# Patient Record
Sex: FEMALE | Race: WHITE | NOT HISPANIC OR LATINO | Employment: OTHER | URBAN - METROPOLITAN AREA
[De-identification: names, ages, dates, MRNs, and addresses within clinical notes are randomized per-mention and may not be internally consistent; named-entity substitution may affect disease eponyms.]

---

## 2018-11-16 ENCOUNTER — OFFICE VISIT CONVERTED (OUTPATIENT)
Dept: ONCOLOGY | Facility: HOSPITAL | Age: 73
End: 2018-11-16
Attending: INTERNAL MEDICINE

## 2019-07-01 ENCOUNTER — HOSPITAL ENCOUNTER (OUTPATIENT)
Dept: ONCOLOGY | Facility: HOSPITAL | Age: 74
Discharge: HOME | End: 2019-07-01
Attending: INTERNAL MEDICINE

## 2019-07-01 ENCOUNTER — OFFICE VISIT CONVERTED (OUTPATIENT)
Dept: ONCOLOGY | Facility: HOSPITAL | Age: 74
End: 2019-07-01
Attending: INTERNAL MEDICINE

## 2019-07-01 LAB
ALBUMIN SERPL-MCNC: 3.7 G/DL (ref 3.5–5)
ALBUMIN/GLOB SERPL: 1.2 {RATIO} (ref 1.4–2.6)
ALP SERPL-CCNC: 57 U/L (ref 43–160)
ALT SERPL-CCNC: 11 U/L (ref 10–40)
ANION GAP SERPL CALC-SCNC: 16 MMOL/L (ref 8–19)
AST SERPL-CCNC: 17 U/L (ref 15–50)
BASOPHILS # BLD AUTO: 0.02 10*3/UL (ref 0–0.2)
BASOPHILS NFR BLD AUTO: 0.4 % (ref 0–3)
BILIRUB SERPL-MCNC: 0.4 MG/DL (ref 0.2–1.3)
BUN SERPL-MCNC: 9 MG/DL (ref 5–25)
BUN/CREAT SERPL: 12 {RATIO} (ref 6–20)
CALCIUM SERPL-MCNC: 9.8 MG/DL (ref 8.7–10.4)
CEA SERPL-MCNC: 1.7 NG/ML (ref 0–5)
CHLORIDE SERPL-SCNC: 104 MMOL/L (ref 99–111)
CONV ABS IMM GRAN: 0.01 10*3/UL (ref 0–0.2)
CONV CO2: 23 MMOL/L (ref 22–32)
CONV IMMATURE GRAN: 0.2 % (ref 0–1.8)
CONV TOTAL PROTEIN: 6.7 G/DL (ref 6.3–8.2)
CREAT UR-MCNC: 0.73 MG/DL (ref 0.5–0.9)
DEPRECATED RDW RBC AUTO: 47.6 FL (ref 36.4–46.3)
EOSINOPHIL # BLD AUTO: 0.14 10*3/UL (ref 0–0.7)
EOSINOPHIL # BLD AUTO: 2.9 % (ref 0–7)
ERYTHROCYTE [DISTWIDTH] IN BLOOD BY AUTOMATED COUNT: 14 % (ref 11.7–14.4)
GFR SERPLBLD BASED ON 1.73 SQ M-ARVRAT: >60 ML/MIN/{1.73_M2}
GLOBULIN UR ELPH-MCNC: 3 G/DL (ref 2–3.5)
GLUCOSE SERPL-MCNC: 86 MG/DL (ref 65–99)
HBA1C MFR BLD: 13.1 G/DL (ref 12–16)
HCT VFR BLD AUTO: 42.2 % (ref 37–47)
LDH SERPL-CCNC: 173 U/L (ref 120–240)
LYMPHOCYTES # BLD AUTO: 1.01 10*3/UL (ref 1–5)
MCH RBC QN AUTO: 28.4 PG (ref 27–31)
MCHC RBC AUTO-ENTMCNC: 31 G/DL (ref 33–37)
MCV RBC AUTO: 91.5 FL (ref 81–99)
MONOCYTES # BLD AUTO: 0.54 10*3/UL (ref 0.2–1.2)
MONOCYTES NFR BLD AUTO: 11.2 % (ref 3–10)
NEUTROPHILS # BLD AUTO: 3.09 10*3/UL (ref 2–8)
NEUTROPHILS NFR BLD AUTO: 64.3 % (ref 30–85)
NRBC CBCN: 0 % (ref 0–0.7)
OSMOLALITY SERPL CALC.SUM OF ELEC: 286 MOSM/KG (ref 273–304)
PLATELET # BLD AUTO: 123 10*3/UL (ref 130–400)
PMV BLD AUTO: 11.5 FL (ref 9.4–12.3)
POTASSIUM SERPL-SCNC: 4 MMOL/L (ref 3.5–5.3)
RBC # BLD AUTO: 4.61 10*6/UL (ref 4.2–5.4)
SODIUM SERPL-SCNC: 139 MMOL/L (ref 135–147)
VARIANT LYMPHS NFR BLD MANUAL: 21 % (ref 20–45)
WBC # BLD AUTO: 4.81 10*3/UL (ref 4.8–10.8)

## 2020-01-10 ENCOUNTER — OFFICE VISIT CONVERTED (OUTPATIENT)
Dept: ONCOLOGY | Facility: HOSPITAL | Age: 75
End: 2020-01-10
Attending: INTERNAL MEDICINE

## 2020-01-10 ENCOUNTER — HOSPITAL ENCOUNTER (OUTPATIENT)
Dept: ONCOLOGY | Facility: HOSPITAL | Age: 75
Discharge: HOME | End: 2020-01-10
Attending: INTERNAL MEDICINE

## 2021-01-11 ENCOUNTER — OFFICE VISIT CONVERTED (OUTPATIENT)
Dept: ONCOLOGY | Facility: HOSPITAL | Age: 76
End: 2021-01-11
Attending: INTERNAL MEDICINE

## 2021-01-11 ENCOUNTER — HOSPITAL ENCOUNTER (OUTPATIENT)
Dept: ONCOLOGY | Facility: HOSPITAL | Age: 76
Discharge: HOME | End: 2021-01-11
Attending: INTERNAL MEDICINE

## 2021-04-09 ENCOUNTER — OFFICE VISIT CONVERTED (OUTPATIENT)
Dept: UROLOGY | Facility: CLINIC | Age: 76
End: 2021-04-09
Attending: NURSE PRACTITIONER

## 2021-04-09 ENCOUNTER — CONVERSION ENCOUNTER (OUTPATIENT)
Dept: SURGERY | Facility: CLINIC | Age: 76
End: 2021-04-09

## 2021-04-09 LAB
BILIRUB UR QL STRIP: NEGATIVE
COLOR UR: YELLOW
CONV BACTERIA IN URINE MICRO: 0
CONV CALCIUM OXALATE CRYSTALS /HPF IN URINE SEDIMENT BY MICROSCOPY: 0
CONV CLARITY OF URINE: CLEAR
CONV PROTEIN IN URINE BY AUTOMATED TEST STRIP: NEGATIVE
CONV UROBILINOGEN IN URINE BY AUTOMATED TEST STRIP: NORMAL
GLUCOSE UR QL: NEGATIVE
HGB UR QL STRIP: NORMAL
KETONES UR QL STRIP: NEGATIVE
LEUKOCYTE ESTERASE UR QL STRIP: NEGATIVE
NITRITE UR QL STRIP: NEGATIVE
PH UR STRIP.AUTO: 6.5 [PH]
RBC #/AREA URNS HPF: 0 /[HPF]
RENAL EPI CELLS #/AREA URNS HPF: 0 /[HPF]
SP GR UR: 1.01
SQUAMOUS SPT QL MICRO: 0
WBC #/AREA URNS HPF: 0 /[HPF]

## 2021-05-11 NOTE — H&P
"   History and Physical      Patient Name: Nika Edwards   Patient ID: [id number]   Sex: Female   YOB: 1945    Referring Provider: Iliana Ho    Visit Date: April 9, 2021    Provider: CHEMO John   Location: [facility]   Location Address: [address] [place] [address]  [id number]   Location Phone: [phone]          Chief Complaint  · \"My doctor sent me because of blood in my urine\"      History Of Present Illness  The patient is a 75 year old /White female, who is a consultation from Iliana Ho, for the evaluation of microhematuria. The patient was found to have microhematuria on an urinalysis approximately 4 weeks ago.     She states the color of her urine has been grossly clear. The patient has no additional complaints. She denies frequency, urgency, dysuria, back pain, fever, chills, nausea, vomiting, and weight loss.     She states that there is no history of recent abdominal or flank trauma. The patient's past medical history is noncontributory.     The patient has not been previously evaluated for hematuria.      The patient's primary care provider saw the patient for dysuria and noted that she had blood in her urine on 3/5/2021.  The patient was given Keflex and a urine culture was pending at that point in time.    The patient presented back on 3/12/2021 for a follow-up and the dysuria and vaginal pruritus had completely resolved.    The patient was seen once again on 3/17/2021 for a \"test of cure\" to ensure that her urine infection was clear.  Apparently there was noted 1+ leukocytes plus for blood and 5-7 red blood cells per high-powered field she was referred here as she has a history of colon cancer.    The patient is a non-smoker    She is on no anticoagulant therapy.    Patient has no family history of  malignancy.           Past Medical History  Arthritis; Bladder disorder; Cancer; Kidney Disease         Past Surgical " "History  Colon; Gallbladder; Ostomy; Port Placement; Bubba Cath Removal         Allergy List  cephalexin; I.V. Dye; Latex Exam Gloves       Allergies Reconciled  Family Medical History  Diabetes, unspecified type; Family history of colon cancer         Social History  Tobacco (Never)         Review of Systems  · Constitutional  o Denies  o : fever, chills  · Eyes  o Denies  o : double vision, cataracts  · HENT  o Denies  o : hearing loss, headaches  · Cardiovascular  o Denies  o : chest pain at rest, chest pain with exercise, irregular heart beats, palpitations, leg cramps with exercise  · Respiratory  o Denies  o : shortness of breath, wheezing, sleep apnea  · Gastrointestinal  o Denies  o : heartburn or indigestion, nausea or vomiting, change in abdominal girth, diarrhea, constipation, blood in stools  · Genitourinary  o Admits  o : additional symptoms as noted in HPI  · Integument  o Denies  o : rash, new skin lesions  · Neurologic  o Denies  o : memory difficulties, headache, mini-strokes, seizures  · Endocrine  o Denies  o : hot flashes, thyroid disorders  · Psychiatric  o Denies  o : depression, schizophrenia, bipolar disorder  · Heme-Lymph  o Denies  o : easy bleeding, easy bruising, sickle cell disease or trait, lymph node enlargement or tenderness  · Allergic-Immunologic  o Denies  o : immune deficiency, HIV, Hepatitis C      Vitals  Date Time BP Position Site L\R Cuff Size HR RR TEMP (F) WT  HT  BMI kg/m2 BSA m2 O2 Sat FR L/min FiO2 HC       04/09/2021 11:13 AM         171lbs 2oz 6'  3\" 21.39 2.03             Physical Examination  · Constitutional  o Appearance  o : Well nourished, well developed patient in no acute distress. Ambulating without difficulty.  · Head and Face  o Head  o :   § Inspection  § : atraumatic, normocephalic  o Face  o :   § Inspection  § : no facial lesions  · Eyes  o Sclerae  o : sclerae white  · Ears, Nose, Mouth and Throat  o Ears  o :   § External Ears  § : appearance within " normal limits, no lesions present  o Nose  o :   § External Nose  § : appearance normal  · Neck  o Inspection/Palpation  o : normal appearance, trachea midline  · Respiratory  o Respiratory Effort  o : Breathing is unlabored without accessory muscle use  o Inspection of Chest  o : normal appearance, no retractions  · Skin and Subcutaneous Tissue  o General Inspection  o : No rashes, lesions or areas of discoloration present. Skin turgor is normal.  o General Palpation  o : No abnormalities, masses or tenderness on palpation.  · Neurologic  o Mental Status Examination  o :   § Orientation  § : grossly oriented to person, place and time  § Speech/Language  § : communication ability within normal limits  o Gait and Station  o : normal gait, able to stand without difficulty  · Psychiatric  o Judgement and Insight  o : judgment and insight intact, judgement for everyday activities and social situations within normal limits, insight intact  o Mood and Affect  o : mood normal, affect appropriate          Results  · In-Office Procedures  o Lab procedure  § Automated dipstick urinalysis with microscopy (01824)   § Color Ur: Yellow   § Clarity Ur: Clear   § Glucose Ur Ql Strip: Negative   § Bilirub Ur Ql Strip: Negative   § Ketones Ur Ql Strip: Negative   § Sp Gr Ur Qn: 1.010   § Hgb Ur Ql Strip: Trace-Intact   § pH Ur-LsCnc: 6.5   § Prot Ur Ql Strip: Negative   § Urobilinogen Ur Strip-mCnc: 0.2 E.U./dL   § Nitrite Ur Ql Strip: Negative   § WBC Est Ur Ql Strip: Negative   § RBC UrnS Qn HPF: 0   § WBC UrnS Qn HPF: 0   § Bacteria UrnS Qn HPF: 0   § Crystals UrnS Qn HPF: 0   § Epithelial Cells (non renal): 0 /HPF  § Epithelial Cells (renal): 0       Assessment  · Dysuria     788.1/R30.0  · Cystitis     595.9/N30.90      Plan  · Medications  o Medications have been Reconciled  o Transition of Care or Provider Policy  · Instructions  o DISCUSSION:  o The patient probably had hematuria related to a UTI or cystitis.The UTI has been  treated and has resolved.  o PLAN: Patient will call the office with any dysuria or if she sees any gross hematuria. She will follow-up in 6 months with a repeat urine dip.  o Electronically Identified Patient Education Materials Provided Electronically            Electronically Signed by: CHEMO John -Author on April 9, 2021 11:48:00 AM

## 2021-05-14 VITALS — WEIGHT: 171.12 LBS | HEIGHT: 72 IN | BODY MASS INDEX: 23.18 KG/M2

## 2021-05-28 VITALS
RESPIRATION RATE: 18 BRPM | TEMPERATURE: 97 F | HEART RATE: 75 BPM | DIASTOLIC BLOOD PRESSURE: 66 MMHG | SYSTOLIC BLOOD PRESSURE: 144 MMHG | WEIGHT: 169.97 LBS | OXYGEN SATURATION: 97 %

## 2021-05-28 VITALS
BODY MASS INDEX: 33.15 KG/M2 | TEMPERATURE: 97.3 F | SYSTOLIC BLOOD PRESSURE: 127 MMHG | HEIGHT: 60 IN | WEIGHT: 168.87 LBS | DIASTOLIC BLOOD PRESSURE: 68 MMHG | OXYGEN SATURATION: 98 % | HEART RATE: 68 BPM

## 2021-05-28 VITALS
HEART RATE: 66 BPM | DIASTOLIC BLOOD PRESSURE: 66 MMHG | WEIGHT: 169.31 LBS | BODY MASS INDEX: 33.24 KG/M2 | SYSTOLIC BLOOD PRESSURE: 131 MMHG | TEMPERATURE: 97.5 F | HEIGHT: 60 IN | OXYGEN SATURATION: 97 %

## 2021-05-28 VITALS
SYSTOLIC BLOOD PRESSURE: 126 MMHG | BODY MASS INDEX: 33.24 KG/M2 | OXYGEN SATURATION: 97 % | TEMPERATURE: 97.8 F | WEIGHT: 169.31 LBS | HEIGHT: 60 IN | HEART RATE: 64 BPM | DIASTOLIC BLOOD PRESSURE: 62 MMHG

## 2021-05-28 NOTE — PROGRESS NOTES
Patient: LALI SANDOVAL     Acct: [id number]     Report: #[id number]  UNIT #: [id number]     : 1945    Encounter Date:01/10/2020  PRIMARY CARE: CATHI INIGUEZ  ***Signed***  --------------------------------------------------------------------------------------------------------------------  NURSE INTAKE      Visit Type      Established Patient Visit            Chief Complaint      RECTAL CANCER            Referring Provider/Copies To      Referring Provider:  Skip Major [facility]      Primary Care Provider:  CATHI INIGUEZ      Copies To:   Skip Major cfe            History and Present Illness      Past Oncology Illness History      1) Rectal Cancer: Diagnosed 11; staged ypT2 ypN0 M0 stage I; MMR/MSI (-)            Treatment History:            1) s/p neoadjuvant concurrent chemoradiotherapy; chemotherapy with 5-FU      2) XRT: total 5040 cGy (-10/19/12)       3) s/p APR 13      4) s/p adjuvant FOLFOX x 6 months (no records)      5) Surveillance  (1/10/20)            HPI - Oncology Interim      Ms. Sandoval comes in for f/u regarding several chronic issues:            1) Rectal Cancer: Ms. Sandoval comes in for follow-up.  In the interim, she     reports that she has been stable.  She denies fevers or chills or sweats.  She     denies melena or hematochezia.  She reports that she anticipates undergoing a     colonoscopy within the next 2 to 3 months with her surgeon.  She reports a     family history of a brother with colorectal cancer.            2) Thrombocytopenia: Ms. Sandoval denies significant bruising or bleeding.            3) h/o DVT: Ms. Sandoval does report a history of DVT remotely.  She reports     left lower extremity edema as a consequence.  It is intermittently present and     of mild severity.  No other modifying factors or associated signs or symptoms.      She denies pleurisy or hemoptysis.            Clinical Trial Participant      No            ECOG Performance Status      1             PAST, FAMILY   Past Medical History      Hematology/Oncology (F):  Colorectal Cancer            Social History      Lives independently:  Yes      Occupation:  retired            Tobacco Use      Tobacco status:  Never smoker            Alcohol Use      Alcohol intake:  None            Substance Use      Substance use:  Denies use            REVIEW OF SYSTEMS      General:  Admits: Fatigue;          Denies: Appetite Change, Fever, Night Sweats, Weight Gain, Weight Loss      Eye:  Denies Blurred Vision, Denies Corrective Lenses, Denies Diplopia, Denies     Vision Changes      ENT:  Denies Headache, Denies Hearing Loss, Denies Hoarseness, Denies Sore     Throat      Cardiovascular:  Denies Chest Pain, Denies Palpitations      Respiratory:  Denies: Coughing Blood, Productive Cough, Shortness of Air,     Wheezing      Gastrointestinal:  Denies Bloody Stools, Denies Constipation, Denies Diarrhea,     Denies Nausea/Vomiting, Denies Problem Swallowing, Denies Unable to Control     Bowels      Genitourinary:  Denies Blood in Urine, Denies Incontinence, Denies Painful     Urination      Musculoskeletal:  Denies Back Pain, Denies Muscle Pain, Denies Painful Joints      Integumentary:  Denies Itching, Denies Lesions, Denies Rash      Neurologic:  Denies Dizziness, Denies Numbness\Tingling, Denies Seizures      Psychiatric:  Denies Anxiety, Denies Depression      Endocrine:  Denies Cold Intolerance, Denies Heat Intolerance      Hematologic/Lymphatic:  Denies Bruising, Denies Bleeding, Denies Enlarged Lymph     Nodes      Reproductive:  Denies: Menopause, Heavy Periods, Pregnant, Still Menstruating            VITAL SIGNS AND SCORES      Vitals      Height 4 ft 11.92 in / 152.2 cm      Weight 168 lbs 13.958 oz / 76.6 kg      BSA 1.74 m2      BMI 33.1 kg/m2      Temperature 97.3 F / 36.28 C - Temporal      Pulse 68      Blood Pressure 127/68 Sitting, Left Arm      Pulse Oximetry 98%, ROOM AIR            Pain Score       Experiencing any pain?:  No      Pain Scale Used:  Numerical      Pain Intensity:  0            Fatigue Score      Experiencing any fatigue?:  Yes      Fatigue (0-10 scale):  5            EXAM      General Appearance:  Positive for: Alert, Oriented x3      Eye:  Positive for: Anicteric Sclerae, PERRLA      HEENT:  Negative for: Scleral Icterus, Thrush      Neck:  Positive for: Supple      Respiratory:  Negative for: Rales, Rhochi      Abdomen/Gastro:  Positive for: Soft;          Negative for: Hepatosplenomegaly      Cardiovascular:  Positive for: RRR;          Negative for: Murmur, Rub      Skin:  Negative for: Induration, Lesions      Psychiatric:  Positive for: AAO X 3, Appropriate Affect      Lower Extremities:  Positive for: Edema            PREVENTION      Hx Influenza Vaccination:  Yes      Date Influenza Vaccine Given:  Nov 1, 2019      Influenza Vaccine Declined:  No      2 or More Falls Past Year?:  No      Fall Past Year with Injury?:  No      Hx Pneumococcal Vaccination:  No      Encouraged to follow-up with:  PCP regarding preventative exams.      Chart initiated by      DANG MATUTE CMA            ALLERGY/MEDS      Allergies      Coded Allergies:             LATEX (Verified  Allergy, Severe, red rash, 1/10/20)           CALCIUM (Verified  Adverse Reaction, Unknown, SWELLING, 1/10/20)           CHOLECALCIFEROL (VITAMIN D3) (Verified  Adverse Reaction, Unknown,     SWELLING, 1/10/20)      Uncoded Allergies:             x-ray dye (Allergy, Severe, unable to swallow, 8/9/16)            Medications      Last Reconciled on 11/20/18 17:20 by LUCRETIA LANDIN MD      (no home meds)   No Conflict Check               Reported         7/21/17      Medications Reviewed:  No Changes made to meds            IMPRESSION/PLAN      Impression      1) Rectal Cancer: Ms. Edwards is a 74-year-old female with a history of rectal    cancer.  I had an extensive discussion today with her regarding this.  Overall,     she  is doing well with no overt evidence of recurrent disease.  We will continue    surveillance as is consistent with the NCCN guidelines.  She indicates     understanding and is amenable to this plan.            2) Thrombocytopenia: This is improved.  We will follow this.            3) h/o DVT: This is clinically stable.  She reports that her edema is unchanged     at this time.  I did caution her that if this were to change, she would need     immediate evaluation and potential resumption of anticoagulation therapy.      Otherwise, she will continue on surveillance as is her preference.            Diagnosis      Rectal cancer - C20            Thrombocytopenia - D69.6            H/O deep venous thrombosis - Z86.718            Notes      New Diagnostics      * CBC, Year         Dx: Rectal cancer - C20      * CMP Comp Metabolic Panel, Year         Dx: Rectal cancer - C20      * Cea/Carcinoembryonic, Year         Dx: Rectal cancer - C20            Plan      1) RTC 1 year with cbc/cmp/CEA prior            Patient Education      Patient Education Provided:  Yes            Electronically signed by CALEB PRICE  01/10/2020 11:45       Disclaimer: Converted document may not contain table formatting or lab diagrams. Please see [employer] [facility] for the authenticated document.

## 2021-05-28 NOTE — PROGRESS NOTES
Patient: LALI SANDOVAL     Acct: [id number]     Report: #[id number]  UNIT #: [id number]     : 1945    Encounter Date:2019  PRIMARY CARE: CATHI INIGUEZ  ***Signed***  --------------------------------------------------------------------------------------------------------------------  NURSE INTAKE      Visit Type      Established Patient Visit            Chief Complaint      RECTAL CA            Referring Provider/Copies To      Referring Provider:  Skip Major [facility]      Primary Care Provider:  CATHI INIGUEZ            History and Present Illness      Past Oncology Illness History      This is a very pleasant 71-year-old female who presents for follow-up for rectal    cancer.            -. Patient initially presented to her primary care provider back in      with rectal bleeding.  She was referred to Dr. Thorne for colonoscopy at that     time and a rectal mass was palpated and visualized on colonoscopy. Biopsy of the    rectal mass showed invasive adenocarcinoma.  The patient was referred to Dr. Ron Maxwell at [facility] and underwent endoscopic ultrasound on 2012.  At     which time, the lesion was extending through the wall of the rectum and there     appeared to be at least four lymph nodes in the perirectal tissue, which were     suspicious for malignant involvement.  A CT scan of the pelvis at the [facility] showed thickening of the left lateral wall of the rectum and a few    nonspecific perirectal lymph nodes, as well as a prominent left periaortic lymph    node.  The patient was then referred to Dr. Mcghee, and Dr. Fan for     preoperative treatment. She received 5 FU based chemo, concurrently with     radiation to a total of 5300 cGy.        -.  She later underwent abdominoperitoneal resection with     permanent ostomy placement under the care of Dr. Maxwell on 2013 and     pathology from this procedure confirmed low grade moderately  differentiated     adenocarcinoma involving a 2.0 x 1.5 cm mass in the rectum.  The tumor extended     into the submucosa and muscularis propria. Margins were negative with the     closest margin 1.7 cm and this was from the radial margin of resection.  No     lymphovascular invasion was noted.  No perineural invasion was noted. Twelve     lymph nodes were examined. No tumor was identified in any of the lymph nodes.      Pathologic staging was yp T2N0.  KRAS mutation and BRAF V600E mutation were not     detected. The tumor demonstrated microsatellite stability.  The patient went on     to receive chemotherapy with FOLFOX.  I do not have the exact dates or doses or     notes documenting the details of her chemotherapy course.  However, the patient     states that she did go onto to receive chemotherapy for at least six months     following her resection.  She also states that during the time of her     chemotherapy she developed a left lower extremity DVT and says that she was     treated with Warfarin for a total of one year.  She continued to follow with Dr. Mcghee until earlier this year. Most recently, she developed urosepsis and was    hospitalized in June and was treated with a course of Levaquin.      -August 2016. Surveillance CT imaging was ordered.  Labs including CBC, CMP were    normal at that time.  CEA was 1.4.  CT scan showed possible post surgical     changes in the pelvis however recurrent disease could not be excluded.  Also     noted on the noncontrast CT was evidence of a possible thrombosis within the     left common iliac vein, so MRI was ordered to further characterize.  Venous     findings were felt to be related to long standing stenosis and there was no     acute thrombus seen.  There was T2 signal intensity in the presacral soft     tissues and PET/CT was ordered.  There was no suspicious FDG activity in the     pelvis, however there was intense uptake within the colon at the hepatic      flexure.  The patient was referred back to Dr. Ortega for colonoscopy.  She     presents today in follow-up.  She feels generally well and denies any weight     loss, h/a, f/c, cp, sob, cough, abd pain, n/v, change in ostomy output,     including dark or bloody stool.      -April 2017.  Per patient colonoscopy was normal.      -May .  CEA 1.06      Nov 16 2018 Presents for routine colon cancer f/u. wbc slightly low, c/o hernia     near stoma      November .  WBC 5.16.  Hemoglobin 14.1.  Platelet count 182,000.  Iron     59.  Percent saturation 17.            HPI - Oncology Interim      Nika is here for follow up of her Stage III rectal cancer.  She is here with     her daughter, Shayna.       She offers no specific complaints.  She denies loss of appetite, loss of weight,    night sweats, fevers, change in bowel or bladder habits.  She knows that she has    an abdominal wall hernia.      Her last labs were in November 2018.  At that time CBC with differential was     normal.  Her last CT scans were in July 2017-            Clinical Trial Participant      No            ECOG Performance Status      1            PAST, FAMILY   Past Medical History      Hematology/Oncology (F):  Colorectal Cancer            Social History      Lives independently:  Yes      Occupation:  retired            Tobacco Use      Tobacco status:  Never smoker            Alcohol Use      Alcohol intake:  None            Substance Use      Substance use:  Denies use            REVIEW OF SYSTEMS      General:  Denies: Appetite Change, Fatigue, Fever, Night Sweats, Weight Gain,     Weight Loss      Eye:  Denies: Blurred Vision, Corrective Lenses, Diplopia, Eye Irritation, Eye     Pain, Eye Redness, Spots in Vision, Vision Loss      ENT:  Denies: Headache, Hearing Loss, Hoarseness, Seizures, Sinus Congestion,     Sore Throat      Cardiovascular:  Denies: Chest Pain, Edema Ankles, Edema Legs, Irregular     Heartbeat, Palpitations       Respiratory:  Denies: Coughing Blood, Productive Cough, Shortness of Air,     Wheezing      Gastrointestinal:  Denies: Bloody Stools, Constipation, Diarrhea, Frequent     Heartburn, Nausea, Problem Swallowing, Tarry Stools, Unable to Control Bowels,     Vomiting      Genitourinary (female):  Denies: Blood in Urine, Decrease Urine Stream, Frequent    Urination, Incontinence, Painful Urination      Musculoskeletal:  Denies: Back Pain, Leg Cramps, Muscle Pain, Muscle Weakness,     Painful Joints, Swollen Joints      Integumentary:  Denies: Bleeds Easily, Bruises Easily, Hair Changes, Jaundice,     Lesions, Mole Changes, Nail Changes, Pigment Changes, Rash, Skin Discoloration      Neurologic:  Denies: Dizziness, Fainting, Numbness\Tingling, Paralysis, Seizures      Psychiatric:  Denies: Anxiety, Confused, Depression, Disoriented, Memory Loss      Endocrine:  Denies: Cold Intolerance, Diabetes, Excessive Sweating, Excessive     Thirst, Excessive Urination, Heat Intolerance, Flushing, Hyperthyroidism,     Hypothyroidism      Hematologic/Lymphatic:  Denies: Bruising, Bleeding, Enlarged Lymph Nodes,     Recurrent Infections, Transfusions      Reproductive:  Denies: Menopause, Heavy Periods, Pregnant, Still Menstruating            VITAL SIGNS AND SCORES      Vitals      Height 4 ft 11.92 in / 152.2 cm      Weight 169 lbs 5.012 oz / 76.8 kg      BSA 1.74 m2      BMI 33.2 kg/m2      Temperature 97.5 F / 36.39 C - Temporal      Pulse 66      Blood Pressure 131/66 Sitting, Right Arm      Pulse Oximetry 97%, RM AIR            Pain Score      Experiencing any pain?:  No      Pain Scale Used:  Numerical      Pain Intensity:  0            Fatigue Score      Experiencing any fatigue?:  No      Fatigue (0-10 scale):  0 (none)            EXAM      Other      General appearance:  in no apparent distress, cooperative, appears stated age.      HEENT: No pallor, no icterus, oral mucosa moist      Neck: Supple, trachea central-not  deviated      Lymph nodes: none palpable peripherally      Cardiovascular: S1-S2 heard, no murmurs, no rubs, no gallops.      Respiratory: Clear to auscultation bilaterally, no adventitious sounds      Abdomen/gastro: Soft, nontender, no palpable hepatosplenomegaly, bowel sounds     heard; colostomy +      Skin: No lesions, no rashes, no petechiae.      Extremities: No pedal edema, peripheral pulses felt, no clubbing            PREVENTION      Hx Influenza Vaccination:  Yes      Date Influenza Vaccine Given:  Nov 1, 2018      Influenza Vaccine Declined:  No      2 or More Falls Past Year?:  No      Fall Past Year with Injury?:  No      Hx Pneumococcal Vaccination:  No      Encouraged to follow-up with:  PCP regarding preventative exams.      Chart initiated by      PAUL LANGSTON CMA            ALLERGY/MEDS      Allergies      Coded Allergies:             LATEX (Verified  Allergy, Severe, red rash, 7/1/19)           CALCIUM (Verified  Adverse Reaction, Unknown, SWELLING, 7/1/19)           CHOLECALCIFEROL (VITAMIN D3) (Verified  Adverse Reaction, Unknown,     SWELLING, 7/1/19)      Uncoded Allergies:             x-ray dye (Allergy, Severe, unable to swallow, 8/9/16)            Medications      Last Reconciled on 11/20/18 17:20 by LUCRETIA LANDIN MD      (no home meds)   No Conflict Check               Reported         7/21/17      Medications Reviewed:  No Changes made to meds            IMPRESSION/PLAN      Diagnosis      Rectal adenocarcinoma - C20            Notes      History of stage III rectal cancer status post 5-FU-based chemoradiation     followed by APR and 6 months of adjuvant FOLFOX completed in 2012.      Clinically in remission.      Obtain CBC with differential, CMP, LDH, CEA today and in 6 months.      If labs and physical examination in 6 months is within normal limits, patient     may be followed yearly by oncology.            New Diagnostics      * CBC With Auto Diff, Routine         Dx: Rectal  adenocarcinoma - C20      * CMP Comp Metabolic Panel, Routine         Dx: Rectal adenocarcinoma - C20      * LDH, Routine         Dx: Rectal adenocarcinoma - C20      * Cea/Carcinoembryonic, Routine         Dx: Rectal adenocarcinoma - C20      * CBC With Auto Diff, 6 Months         Dx: Rectal adenocarcinoma - C20      * CMP Comp Metabolic Panel, 6 Months         Dx: Rectal adenocarcinoma - C20      * LDH, 6 Months         Dx: Rectal adenocarcinoma - C20      * Cea/Carcinoembryonic, 6 Months         Dx: Rectal adenocarcinoma - C20            Patient Education      Patient Education Provided:  Yes                 Disclaimer: Converted document may not contain table formatting or lab diagrams. Please see [employer] for the authenticated document.

## 2021-05-28 NOTE — PROGRESS NOTES
Patient: LALI SANDOVAL     Acct: [id number]     Report: #[id number]  UNIT #: [id number]     : 1945    Encounter Date:2021  PRIMARY CARE: CATHI INIGUEZ  ***Signed***  --------------------------------------------------------------------------------------------------------------------  NURSE INTAKE      Visit Type      Established Patient Visit            Chief Complaint      RECTAL CA            Referring Provider/Copies To      Primary Care Provider:  CATHI INIGUEZ      Copies To:   CATHI INIGUEZ            History and Present Illness      Past Oncology Illness History      Rectal Cancer: Diagnosed 12; staged ypT2 ypN0 M0 stage I; MMR/MSI (-)      - neoadjuvant concurrent chemoradiotherapy; chemotherapy with 5-FU      - XRT: total 5040 cGy (-10/19/12)       - s/p APR 13      - s/p adjuvant FOLFOX x 6 months (no records)            HPI - Oncology Interim      Patient comes in today for routine follow-up.  She was treated for rectal cancer    in .  She has no new concerns today but says that she missed her annual     colonoscopy in April due to the shutdown from coronavirus.  She typically     follows up with Dr. Ortega, her colorectal surgeon.  She denies weight loss,     abdominal pain, or GI bleeding.            I reviewed the patient's labs from 2021 which were done at [facility].  Her CBC is normal with a WBC count of 5.5, hemoglobin 14.9,     platelet count 207, normal differential.  Her CMP is also completely normal and     her CEA is within normal limits at 3.3.            Clinical Trial Participant      No            ECOG Performance Status      1            PAST, FAMILY   Past Medical History      Hematology/Oncology (F):  Colorectal Cancer            Social History      Lives independently:  Yes      Occupation:  retired            Tobacco Use      Tobacco status:  Never smoker            Substance Use      Substance use:  Denies use            REVIEW OF  SYSTEMS      General:  Denies: Appetite Change, Fatigue, Fever, Night Sweats, Weight Gain,     Weight Loss      Eye:  Admits Corrective Lenses; Denies Blurred Vision, Denies Diplopia, Denies     Vision Changes      ENT:  Denies Headache, Denies Hearing Loss, Denies Hoarseness, Denies Sore     Throat      Cardiovascular:  Denies Chest Pain, Denies Palpitations      Respiratory:  Denies: Cough, Coughing Blood, Productive Cough, Shortness of Air,    Wheezing      Gastrointestinal:  Denies Bloody Stools, Denies Constipation, Denies Diarrhea,     Denies Nausea/Vomiting, Denies Problem Swallowing, Denies Unable to Control     Bowels      Genitourinary:  Denies Blood in Urine, Denies Incontinence, Denies Painful     Urination      Musculoskeletal:  Denies Back Pain, Denies Muscle Pain, Denies Painful Joints      Integumentary:  Denies Itching, Denies Lesions, Denies Rash      Neurologic:  Denies Dizziness, Denies Numbness\Tingling, Denies Seizures      Psychiatric:  Denies Anxiety, Denies Depression      Endocrine:  Denies Cold Intolerance, Denies Heat Intolerance      Hematologic/Lymphatic:  Admits Bruising; Denies Bleeding, Denies Enlarged Lymph     Nodes      Reproductive:  Denies: Menopause, Heavy Periods, Pregnant, Still Menstruating            VITAL SIGNS AND SCORES      Vitals      Weight 169 lbs 15.595 oz / 77.1 kg      Temperature 97.0 F / 36.11 C - Temporal      Pulse 75      Respirations 18      Blood Pressure 144/66 Sitting, Left Arm      Pulse Oximetry 97%, RM AIR            Pain Score      Experiencing any pain?:  No      Pain Scale Used:  Numerical      Pain Intensity:  0            Fatigue Score      Experiencing any fatigue?:  No      Fatigue (0-10 scale):  0 (none)            EXAM      General: Alert, cooperative, no acute distress      Eyes: Anicteric sclera, PERRLA      Respiratory: CTAB, normal respiratory effort      Abdomen: Normal active bowel sounds, no tenderness, no distention       Cardiovascular: RRR, no murmur, no lower extremity edema      Skin: Normal tone, no rash, no lesions      Psychiatric: Appropriate affect, intact judgment      Neurologic: No focal sensory or motor deficits, no weakness, numbness, dizziness      Musculoskeletal: Normal muscle strength and tone      Extremities: No clubbing, cyanosis, or deformities            PREVENTION      Hx Influenza Vaccination:  Yes      Date Influenza Vaccine Given:  Nov 2, 2020      Influenza Vaccine Declined:  No      2 or More Falls in Past Year?:  No      Fall Past Year with Injury?:  No      Hx Pneumococcal Vaccination:  No      Encouraged to follow-up with:  PCP regarding preventative exams.      Chart initiated by      DENICE BRAXTON MA            ALLERGY/MEDS      Allergies      Coded Allergies:             LATEX (Verified  Allergy, Severe, red rash, 1/11/21)           CALCIUM (Verified  Adverse Reaction, Unknown, SWELLING, 1/11/21)           CHOLECALCIFEROL (VITAMIN D3) (Verified  Adverse Reaction, Unknown,     SWELLING, 1/11/21)      Uncoded Allergies:             x-ray dye (Allergy, Severe, unable to swallow, 8/9/16)            Medications      Last Reconciled on 11/20/18 17:20 by LUCRETIA LANDIN MD      (no home meds)   No Conflict Check               Reported         7/21/17      Medications Reviewed:  No Changes made to meds            IMPRESSION/PLAN      Diagnosis      Rectal cancer - C20            Notes      New Diagnostics      * CBC, Year         Dx: Rectal cancer - C20      * Cea/Carcinoembryonic, Year         Dx: Rectal cancer - C20            Plan      Rectal adenocarcinoma: Diagnosed and treated with neoadjuvant chemoradiation     followed by APR on 2/4/2013.  Her treatment was at an outside hospital and     records are not available.  She comes in today for routine follow-up.  I have     reviewed her labs and they are all within normal limits.  I will send her back t    o Dr. Ortega, her colorectal surgeon for repeat  colonoscopy.  She will follow up    with me in 1 year with repeat labs including a CEA.            Patient Education      Patient Education Provided:  Yes            Electronically signed by SKYLER VILLEDA  01/31/2021 22:41       Disclaimer: Converted document may not contain table formatting or lab diagrams. Please see [employer] for the authenticated document.

## 2021-05-28 NOTE — PROGRESS NOTES
Patient: LALI SANDOVAL     Acct: [id number]     Report: #[id number]  UNIT #: [id number]     : 1945    Encounter Date:2018  PRIMARY CARE: CATHI INIGUEZ  ***Signed***  --------------------------------------------------------------------------------------------------------------------  NURSE INTAKE      Visit Type      Established Patient Visit            Chief Complaint      RECTAL CANCER            Referring Provider/Copies To      Referring Provider:  Skip Major [facility]            History and Present Illness      Past Oncology Illness History      This is a very pleasant 71-year-old female who presents for follow-up for rectal    cancer.            -. Patient initially presented to her primary care provider back in      with rectal bleeding.  She was referred to Dr. Thorne for colonoscopy at that     time and a rectal mass was palpated and visualized on colonoscopy. Biopsy of the    rectal mass showed invasive adenocarcinoma.  The patient was referred to Dr. Ron Maxwell at [facility] and underwent endoscopic ultrasound on 2012.  At     which time, the lesion was extending through the wall of the rectum and there     appeared to be at least four lymph nodes in the perirectal tissue, which were     suspicious for malignant involvement.  A CT scan of the pelvis at the [facility] showed thickening of the left lateral wall of the rectum and a few    nonspecific perirectal lymph nodes, as well as a prominent left periaortic lymph    node.  The patient was then referred to Dr. Mcghee, and Dr. Fan for     preoperative treatment. She received 5 FU based chemo, concurrently with     radiation to a total of 5300 cGy.        -.  She later underwent abdominoperitoneal resection with perman    ent ostomy placement under the care of Dr. Maxwell on 2013 and pathology     from this procedure confirmed low grade moderately differentiated adenocarcinoma     involving a 2.0 x 1.5 cm mass in the rectum.  The tumor extended into the     submucosa and muscularis propria. Margins were negative with the closest margin     1.7 cm and this was from the radial margin of resection.  No lymphovascular     invasion was noted.  No perineural invasion was noted. Twelve lymph nodes were     examined. No tumor was identified in any of the lymph nodes.  Pathologic staging    was yp T2N0.  KRAS mutation and BRAF V600E mutation were not detected. The tumor    demonstrated microsatellite stability.  The patient went on to receive     chemotherapy with FOLFOX.  I do not have the exact dates or doses or notes     documenting the details of her chemotherapy course.  However, the patient states    that she did go onto to receive chemotherapy for at least six months following     her resection.  She also states that during the time of her chemotherapy she     developed a left lower extremity DVT and says that she was treated with Warfarin    for a total of one year.  She continued to follow with Dr. Mcghee until     earlier this year. Most recently, she developed urosepsis and was hospitalized     in June and was treated with a course of Levaquin.      -August 2016. Surveillance CT imaging was ordered.  Labs including CBC, CMP were    normal at that time.  CEA was 1.4.  CT scan showed possible post surgical     changes in the pelvis however recurrent disease could not be excluded.  Also     noted on the noncontrast CT was evidence of a possible thrombosis within the     left common iliac vein, so MRI was ordered to further characterize.  Venous     findings were felt to be related to long standing stenosis and there was no     acute thrombus seen.  There was T2 signal intensity in the presacral soft     tissues and PET/CT was ordered.  There was no suspicious FDG activity in the     pelvis, however there was intense uptake within the colon at the hepatic     flexure.  The patient was  referred back to Dr. Ortega for colonoscopy.  She     presents today in follow-up.  She feels generally well and denies any weight     loss, h/a, f/c, cp, sob, cough, abd pain, n/v, change in ostomy output,     including dark or bloody stool.      -April 2017.  Per patient colonoscopy was normal.      -May .  CEA 1.06      Nov 16 2018 Presents for routine colon cancer f/u. wbc slightly low, c/o hernia     near stoma      November .  WBC 5.16.  Hemoglobin 14.1.  Platelet count 182,000.  Iron     59.  Percent saturation 17.            HPI - Oncology Interim      Patient presents today for evaluation of abdominal pain in setting of history of    rectal cancer.            Clinical Trial Participant      No            ECOG Performance Status      1            PAST, FAMILY   Past Medical History      Hematology/Oncology (F):  Colorectal Cancer            Social History      Lives independently:  Yes      Occupation:  retired            Tobacco Use      Tobacco status:  Never smoker            Alcohol Use      Alcohol intake:  None            Substance Use      Substance use:  Denies use            REVIEW OF SYSTEMS      General:  Admits: Fatigue;          Denies: Appetite Change, Fever, Night Sweats, Weight Gain, Weight Loss      Eye:  Denies: Blurred Vision, Corrective Lenses, Diplopia, Eye Irritation, Eye     Pain, Eye Redness, Spots in Vision, Vision Loss      ENT:  Denies: Headache, Hearing Loss, Hoarseness, Seizures, Sinus Congestion,     Sore Throat      Cardiovascular:  Denies: Chest Pain, Edema Ankles, Edema Legs, Irregular H    eartbeat, Palpitations      Respiratory:  Denies: Coughing Blood, Productive Cough, Shortness of Air,     Wheezing      Gastrointestinal:  Denies: Bloody Stools, Constipation, Diarrhea, Frequent     Heartburn, Nausea, Problem Swallowing, Tarry Stools, Unable to Control Bowels,     Vomiting      Genitourinary (female):  Denies: Blood in Urine, Decrease Urine Stream, Frequent     Urination, Incontinence, Painful Urination      Musculoskeletal:  Denies: Back Pain, Leg Cramps, Muscle Pain, Muscle Weakness,     Painful Joints, Swollen Joints      Integumentary:  Denies: Bleeds Easily, Bruises Easily, Hair Changes, Jaundice,     Lesions, Mole Changes, Nail Changes, Pigment Changes, Rash, Skin Discoloration      Neurologic:  Denies: Dizziness, Fainting, Numbness\Tingling, Paralysis, Seizures      Psychiatric:  Admits: Anxiety;          Denies: Confused, Depression, Disoriented, Memory Loss      Endocrine:  Denies: Cold Intolerance, Diabetes, Excessive Sweating, Excessive     Thirst, Excessive Urination, Heat Intolerance, Flushing, Hyperthyroidism, Hy    pothyroidism            VITAL SIGNS AND SCORES      Vitals      Height 4 ft 11.92 in / 152.2 cm      Weight 169 lbs 5.012 oz / 76.8 kg      BSA 1.74 m2      BMI 33.2 kg/m2      Temperature 97.8 F / 36.56 C - Temporal      Pulse 64      Blood Pressure 126/62 Sitting, Left Arm      Pulse Oximetry 97%, ROOM AIR            Pain Score      Experiencing any pain?:  No      Pain Scale Used:  Numerical      Pain Intensity:  0            Fatigue Score      Experiencing any fatigue?:  No      Fatigue (0-10 scale):  0 (none)            EXAM      General Appearance:  Positive for: Alert, Oriented x3, Cooperative      Eye:  Positive for: Anicteric Sclerae, Moist Conjunctiva, PERRLA      HEENT:  Positive for: Oropharynx clear;          Negative for: Pallor      Neck:  Positive for: Full ROM;          Negative for: JVD      Respiratory:  Positive for: CTAB, Normal Respiratory Effort      Abdomen/Gastro:  Positive for: Normal Active Bowel Sounds;          Negative for: Hepatosplenomegaly      Cardiovascular:  Positive for: Normal PMI;          Negative for: Murmur      Skin:  Positive for: Normal Temperature, Normal Texture and Turgor, Normal Tone      Psychiatric:  Positive for: AAO X 3, Appropriate Affect      Neurologic:  Positive for: Cranial Ner II-XII  Intact, Deep Tendon Reflexes      Musculoskeletal:  Positive for: Full ROM Lower Extremety, Full ROM Upper     Extremety, Full Muscle Strength      Lower Extremities:  Negative for: Clubbing, Deformities, Digital Cyanosis,     Digital Ischemia, Edema, Normal Gait and Station, Pedal Pulses Intact, Pedal     Pulses Symetrical, Weakness      Upper Extremities:  Negative for: Clubbing, Deformities, Digital Cyanosis,     Digital Ischemia, Edema, Weakness      Lymphatic:  Negative for: Axillary, Cervical, Epitrochlear, Femoral,     Infraclavicular, Inguinal, Occipital, Popliteal, Posterior auricular,     Preauricular, Supraclavicular            PREVENTION      Hx Influenza Vaccination:  Yes      Date Influenza Vaccine Given:  Nov 1, 2018      Influenza Vaccine Declined:  No      2 or More Falls Past Year?:  No      Fall Past Year with Injury?:  No      Hx Pneumococcal Vaccination:  No      Encouraged to follow-up with:  PCP regarding preventative exams.      Chart initiated by      DANG MATUTE CMA            ALLERGY/MEDS      Allergies      Coded Allergies:             LATEX (Verified  Allergy, Severe, red rash, 11/16/18)           CALCIUM (Verified  Adverse Reaction, Unknown, SWELLING, 11/16/18)           CHOLECALCIFEROL (VITAMIN D3) (Verified  Adverse Reaction, Unknown,     SWELLING, 11/16/18)      Uncoded Allergies:             x-ray dye (Allergy, Severe, unable to swallow, 8/9/16)            Medications      Last Reconciled on 11/20/18 17:20 by LUCRETIA LANDIN MD      (no home meds)   No Conflict Check               Reported         7/21/17      Medications Reviewed:  No Changes made to meds            IMPRESSION/PLAN      Diagnosis      Rectal adenocarcinoma - C20      -Stage III rectal cancer.      -s/p 5FU/radiation with subsequent APR and 6 months adjuvant FOLFOX, 2012      -Patient's history of Pet avid colon lesion in hepatic flexure.  A tubulovillous    adenoma was removed from the hepatic flexure, negative  for high grade dysplasia     or carcinoma.  A transverse colon polyp was also resected and was negative for     malignancy.  Plan is for patient to return to Dr. Ortega in 6 months with repeat    colonoscopy at that time.        -Follow-up visit with Dr. Ortega November 2017.  Colonoscopy was normal per     patient.      -Doesn't well no signs and symptoms of recurrent      -Continue observation            Abdominal pain - R10.9      -Likely due to underlying constipation      -Continue observation      -Counseled patient if it worsens we will get a CT scan            Anxiety in cancer patient - F41.9, C80.1      -Anxiety is well controlled today.      -Continue treatment.             Neoplastic (malignant) related fatigue - R53.0      -Due to underlying cancer treatment.       -Recommended increased physical activity including exercise      -Counseled patient to improve nutrition including increased protein intake.        -Discussed patient to increase fluid hydration to at least 8 cups of fluid to     decrease risk of dehydration.             Notes      -Today's visit is an encounter for oncology evaluation and treatment.       -Patient's radiology exams, blood tests, physicians' notes, and medications were    reviewed today to assess patient's medical treatment plan. ECOG 1.       -Patient was advised to call us right away if there are any new symptoms after     today's visit for an urgent evaluation since this may be due to cancer     worsening. Patient voiced understanding and agreed to do so.      -Radiology imaging tests from September 2017 to today were reviewed     independently by me by direct visualization of the images.        -Old medical records were reviewed and summarized in chronological order in the     HPI today to maintain an updated medical record.      New Diagnostics      * Iron Profile, Routine         Dx: Anemia - D64.9      * B12      Dx: Anemia - D64.9      * LDH, Routine         Dx:  Anemia - D64.9      * CBC With Auto Diff, Routine         Dx: Anemia - D64.9      * Reticulocyte Count, Routine         Dx: Anemia - D64.9            Plan      Counseled patient to call us for an urgent visit if needed.  Check blood tests     and/or imaging tests as shown above.  Return to clinic in 8 months.  July 2019.     labs today. to continue annual visits to surgeon.                 Disclaimer: Converted document may not contain table formatting or lab diagrams. Please see [employer] for the authenticated document.

## 2022-01-10 ENCOUNTER — TELEPHONE (OUTPATIENT)
Dept: ONCOLOGY | Facility: HOSPITAL | Age: 77
End: 2022-01-10

## 2022-01-10 DIAGNOSIS — C20 RECTAL ADENOCARCINOMA: Primary | ICD-10-CM

## 2022-01-10 NOTE — TELEPHONE ENCOUNTER
Caller: Nika Edwards    Relationship to patient: Self    Best call back number: [phone]    Chief complaint: PATIENT IS CURRENTLY SCHEDULED FOR A FU ON 1/17/22. SHE WOULD LIKE TO KNOW IF SHE NEEDS LABS AND WHEN SHE  NEEDS TO HAVE THEM DONE AT DR. IBRAHIM OFFICE.      Additional notes:  PLEASE CALL TO ADVISE

## 2022-01-12 ENCOUNTER — TELEPHONE (OUTPATIENT)
Dept: ONCOLOGY | Facility: OTHER | Age: 77
End: 2022-01-12

## 2022-01-12 DIAGNOSIS — C20 RECTAL CANCER: Primary | ICD-10-CM

## 2022-01-12 NOTE — TELEPHONE ENCOUNTER
PT CALLED TO CHECK ON LAB ORDER BEING SENT TO DR IBRAHIM OFFICE. SHE WANTED TO GET HER LABS DONE IN 30 MINS. CALLED CLINICAL LINE, THEY ARE GOING TO CHECK WITH HER NURSE AND CALL HER BACK IF SHE NEEDS LABS DONE. INFORMED PT WHO V/U.

## 2022-01-17 ENCOUNTER — OFFICE VISIT (OUTPATIENT)
Dept: ONCOLOGY | Facility: HOSPITAL | Age: 77
End: 2022-01-17

## 2022-01-17 VITALS
WEIGHT: 167.11 LBS | SYSTOLIC BLOOD PRESSURE: 142 MMHG | HEART RATE: 64 BPM | BODY MASS INDEX: 20.89 KG/M2 | DIASTOLIC BLOOD PRESSURE: 83 MMHG | RESPIRATION RATE: 18 BRPM | OXYGEN SATURATION: 97 % | TEMPERATURE: 97.3 F

## 2022-01-17 DIAGNOSIS — C20 RECTAL CANCER: ICD-10-CM

## 2022-01-17 PROBLEM — N32.9 BLADDER DISORDER: Status: ACTIVE | Noted: 2022-01-17

## 2022-01-17 PROBLEM — C80.1 CANCER (HCC): Status: ACTIVE | Noted: 2022-01-17

## 2022-01-17 PROBLEM — C43.9 MELANOMA: Status: ACTIVE | Noted: 2022-01-17

## 2022-01-17 PROBLEM — M19.90 ARTHRITIS: Status: ACTIVE | Noted: 2022-01-17

## 2022-01-17 PROBLEM — N28.9 KIDNEY DISEASE: Status: ACTIVE | Noted: 2022-01-17

## 2022-01-17 PROCEDURE — 99213 OFFICE O/P EST LOW 20 MIN: CPT | Performed by: INTERNAL MEDICINE

## 2022-01-17 PROCEDURE — G0463 HOSPITAL OUTPT CLINIC VISIT: HCPCS | Performed by: INTERNAL MEDICINE

## 2022-01-17 NOTE — PROGRESS NOTES
Patient  Nika Edwards    Location  [facility] HEMATOLOGY & ONCOLOGY    Chief Complaint  Rectal Cancer (-F2)    Referring Provider: Fred Hedrick MD  PCP: Fred Hedrick MD    Subjective          Oncology/Hematology History Overview Note   Rectal Cancer: Diagnosed 8/6/12; staged ypT2 ypN0 M0 stage I; MMR/MSI (-)      - neoadjuvant concurrent chemoradiotherapy; chemotherapy with 5-FU      - XRT: total 5040 cGy (9/12-10/19/12)       - s/p APR 2/4/13      - s/p adjuvant FOLFOX x 6 months (no records)         Rectal cancer (HCC)   8/6/2012 Initial Diagnosis    Rectal cancer (HCC)         History of Present Illness  Patient comes in today for 1 year follow-up.  She is initially diagnosed with rectal cancer in 2012.  She has not recently had labs which are in our system.  She would prefer to get these through her primary care provider.    Patient notes that she is overdue for repeat endoscopy.  Her last colonoscopy was done by Dr. Ortega at the [facility].  She was found to have polyps and told to have repeat colonoscopy in 3 years.  Due to Covid this was delayed.    Review of Systems   Constitutional: Positive for fatigue. Negative for appetite change, diaphoresis, fever, unexpected weight gain and unexpected weight loss.   HENT: Negative for hearing loss, sore throat and voice change.    Eyes: Negative for blurred vision, double vision, pain, redness and visual disturbance.   Respiratory: Negative for cough, shortness of breath and wheezing.    Cardiovascular: Negative for chest pain, palpitations and leg swelling.   Endocrine: Negative for cold intolerance, heat intolerance, polydipsia and polyuria.   Genitourinary: Negative for decreased urine volume, difficulty urinating, frequency and urinary incontinence.   Musculoskeletal: Negative for arthralgias, back pain, joint swelling and myalgias.   Skin: Negative for color change, rash, skin lesions and wound.   Neurological:  Negative for dizziness, seizures, numbness and headache.   Hematological: Negative for adenopathy. Does not bruise/bleed easily.   Psychiatric/Behavioral: Negative for depressed mood. The patient is not nervous/anxious.    All other systems reviewed and are negative.      Past Medical History:   Diagnosis Date   • Arthritis    • Bladder disorder    • Cancer (HCC)    • Kidney disease      Past Surgical History:   Procedure Laterality Date   • CENTRAL VENOUS CATHETER REMOVAL     • COLON SURGERY     • GALLBLADDER SURGERY     • OSTOMY TAKE DOWN     • PORTACATH PLACEMENT       Social History     Socioeconomic History   • Marital status:    Tobacco Use   • Smoking status: Never Smoker     Family History   Problem Relation Age of Onset   • Diabetes Father    • Colon cancer Brother        Objective   Physical Exam  General: Alert, cooperative, no acute distress  Eyes: Anicteric sclera, PERRLA  Respiratory: normal respiratory effort  Cardiovascular: no lower extremity edema  Skin: Normal tone, no rash, no lesions  Psychiatric: Appropriate affect, intact judgment  Neurologic: No focal sensory or motor deficits, normal cognition   Musculoskeletal: Normal muscle strength and tone  Extremities: No clubbing, cyanosis, or deformities    Vitals:    01/17/22 1303   BP: 142/83   Pulse: 64   Resp: 18   Temp: 97.3 °F (36.3 °C)   SpO2: 97%   Weight: 75.8 kg (167 lb 1.7 oz)   PainSc: 0-No pain     ECOG score: 0         PHQ-9 Total Score: 0       Result Review :   The following data was reviewed by: Cathy Greene MD PhD on 01/17/2022:  Lab Results   Component Value Date    HGB 13.10 07/01/2019    HCT 42.2 07/01/2019    MCV 91.5 07/01/2019    .00 (L) 07/01/2019    WBC 4.81 07/01/2019    NEUTROABS 3.09 07/01/2019    LYMPHSABS 1.01 07/01/2019    MONOSABS 0.54 07/01/2019    EOSABS 0.14 07/01/2019    BASOSABS 0.02 07/01/2019     Lab Results   Component Value Date    GLUCOSE 86 07/01/2019    BUN 9 07/01/2019    CREATININE  0.73 07/01/2019     07/01/2019    K 4.0 07/01/2019     07/01/2019    CO2 23 07/01/2019    CALCIUM 9.8 07/01/2019    PROTEINTOT 6.7 07/01/2019    ALBUMIN 3.7 07/01/2019    BILITOT 0.40 07/01/2019    ALKPHOS 57 07/01/2019    AST 17 07/01/2019    ALT 11 07/01/2019          Assessment and Plan    Diagnoses and all orders for this visit:    1. Rectal cancer (HCC)      The patient is now 10 years out from her diagnosis and treatment.  I encouraged her to reschedule the follow-up appointment with Dr. Ortega for repeat endoscopy given her recent colon polyps.  Patient does not need further follow-up with oncology unless desired.  At this time she elects to continue to follow-up with her primary care provider.  She or her PCP will contact our clinic if additional concerns.    Patient was given instructions and counseling regarding her condition or for health maintenance advice. Please see specific information pulled into the AVS if appropriate.     Cathy Greene MD PhD    1/30/2022

## 2024-05-31 ENCOUNTER — OFFICE VISIT (OUTPATIENT)
Dept: ORTHOPEDIC SURGERY | Facility: CLINIC | Age: 79
End: 2024-05-31
Payer: MEDICARE

## 2024-05-31 VITALS
BODY MASS INDEX: 31.8 KG/M2 | HEIGHT: 60 IN | HEART RATE: 75 BPM | SYSTOLIC BLOOD PRESSURE: 121 MMHG | DIASTOLIC BLOOD PRESSURE: 72 MMHG | OXYGEN SATURATION: 94 % | WEIGHT: 162 LBS

## 2024-05-31 DIAGNOSIS — S52.551A OTHER CLOSED EXTRA-ARTICULAR FRACTURE OF DISTAL END OF RIGHT RADIUS, INITIAL ENCOUNTER: ICD-10-CM

## 2024-05-31 DIAGNOSIS — M25.531 RIGHT WRIST PAIN: Primary | ICD-10-CM

## 2024-05-31 DIAGNOSIS — M25.529 ELBOW PAIN, UNSPECIFIED LATERALITY: ICD-10-CM

## 2024-05-31 NOTE — PROGRESS NOTES
"Chief Complaint  Initial Evaluation and Pain of the Right Wrist    Subjective          Nika Edwards presents to [facility] for   Pain      Nika presents today for evaluation of her right wrist.  She sustained a fall and caught herself with the right wrist.  She also struck the back of her head.  She was placed into a sugar-tong splint for a distal radius fracture at an outside facility.  She denies any pain other than the right upper extremity.  She is having elbow pain as well.  She denies any associated numbness.    Allergies   Allergen Reactions    Iodinated Contrast Media Anaphylaxis     COULDN'T BREATHE OR SWALLOW    Latex Rash and Other (See Comments)     SKIN IRRITATION    Cephalexin Nausea And Vomiting    Contrast Dye (Echo Or Unknown Ct/Mr) Unknown - Low Severity        Social History     Socioeconomic History    Marital status:    Tobacco Use    Smoking status: Never    Smokeless tobacco: Never   Vaping Use    Vaping status: Never Used        I reviewed the patient's chief complaint, history of present illness, review of systems, past medical history, surgical history, family history, social history, medications, and allergy list.     REVIEW OF SYSTEMS    Constitutional: Denies fevers, chills, weight loss  Cardiovascular: Denies chest pain, shortness of breath  Skin: Denies rashes, acute skin changes  Neurologic: Denies headache, loss of consciousness  MSK: Right wrist pain      Objective   Vital Signs:   /72   Pulse 75   Ht 152.4 cm (60\")   Wt 73.5 kg (162 lb)   SpO2 94%   BMI 31.64 kg/m²     Body mass index is 31.64 kg/m².    Physical Exam    General: Alert. No acute distress.   Right upper extremity: Splint removed.  Nontender over the elbow.  Full elbow flexion and extension.  Bruising extending down the ulnar aspect of the forearm to the wrist.  Tender over the distal radius.  Shortened and radial deviated deformity.  Neurovascular intact in the " hand.  Compartments are soft.    Orthopedic Injury Treatment    Date/Time: 5/31/2024 2:51 PM    Performed by: Franchesca Jones MA  Authorized by: Neri Ruano MD  Injury location: wrist  Location details: right wrist  Pre-procedure neurovascular assessment: neurovascularly intact    Anesthesia:  Local anesthesia used: no    Sedation:  Patient sedated: no    Immobilization: cast  Supplies used: cotton padding (FIBERGLASS)  Post-procedure neurovascular assessment: post-procedure neurovascularly intact  Patient tolerance: patient tolerated the procedure well with no immediate complications  Comments: Closed treatment was obtained and fiberglass cast was applied.  The patient tolerated the procedure without any complications.          Imaging Results (Most Recent)       Procedure Component Value Units Date/Time    XR Elbow 2 View Right [[id number]] Resulted: 05/31/24 1620     Updated: 05/31/24 1620    Narrative:      Indications: Fall, right elbow pain    Views: AP and lateral right elbow    Findings: No fractures are seen.  Ulnohumeral and radiocapitellar joints   are well aligned.    Comparative Data: No comparative data available    XR Wrist 2 View Right [[id number]] Resulted: 05/31/24 1619     Updated: 05/31/24 1620    Narrative:      Indications: Follow-up right distal radius fracture    Views: AP and lateral right wrist    Findings: Impacted and dorsally angulated fracture of the distal radius is   again seen.  A separate ulnar styloid fracture appears stable.  The   radiocarpal joint is reduced.  Degenerative changes in the hand are   noticed.    Comparative Data: Comparative data found and reviewed today                     Assessment and Plan        XR Elbow 2 View Right    Result Date: 5/31/2024  Narrative: Indications: Fall, right elbow pain Views: AP and lateral right elbow Findings: No fractures are seen.  Ulnohumeral and radiocapitellar joints are well aligned. Comparative Data: No comparative data  available    XR Wrist 2 View Right    Result Date: 5/31/2024  Narrative: Indications: Follow-up right distal radius fracture Views: AP and lateral right wrist Findings: Impacted and dorsally angulated fracture of the distal radius is again seen.  A separate ulnar styloid fracture appears stable.  The radiocarpal joint is reduced.  Degenerative changes in the hand are noticed. Comparative Data: Comparative data found and reviewed today      Diagnoses and all orders for this visit:    1. Right wrist pain (Primary)  -     XR Wrist 2 View Right    2. Elbow pain, unspecified laterality  -     XR Elbow 2 View Right    3. Other closed extra-articular fracture of distal end of right radius, initial encounter    Other orders  -     Orthopedic Injury Treatment        We reviewed her imaging today.  We had a long discussion about operative and nonoperative treatment options.  She elected to proceed with nonoperative management.  A well-padded short arm cast was applied.  She should remain nonweightbearing.  We discussed ice and elevation for swelling.  We discussed finger range of motion exercises.  We discussed cast care.  Follow-up in 1 week for reevaluation.  Will obtain new x-rays of the right wrist in the cast.  Anticipate 6 weeks of casting and transition to bracing.      Call or return if worsening symptoms.    Scribed for Neri Ruano MD by Yessi Williamson  05/31/2024   14:51 EDT         Follow Up       1 week    Patient was given instructions and counseling regarding her condition or for health maintenance advice. Please see specific information pulled into the AVS if appropriate.     I have personally performed the services described in this document as scribed by the above individual and it is both accurate and complete. Neri Ruano MD 05/31/24 16:26 EDT

## 2024-06-10 ENCOUNTER — OFFICE VISIT (OUTPATIENT)
Dept: ORTHOPEDIC SURGERY | Facility: CLINIC | Age: 79
End: 2024-06-10
Payer: MEDICARE

## 2024-06-10 VITALS
DIASTOLIC BLOOD PRESSURE: 77 MMHG | WEIGHT: 162 LBS | HEIGHT: 63 IN | HEART RATE: 74 BPM | SYSTOLIC BLOOD PRESSURE: 124 MMHG | OXYGEN SATURATION: 93 % | BODY MASS INDEX: 28.7 KG/M2

## 2024-06-10 DIAGNOSIS — S52.551A OTHER CLOSED EXTRA-ARTICULAR FRACTURE OF DISTAL END OF RIGHT RADIUS, INITIAL ENCOUNTER: Primary | ICD-10-CM

## 2024-06-10 PROCEDURE — 1160F RVW MEDS BY RX/DR IN RCRD: CPT | Performed by: STUDENT IN AN ORGANIZED HEALTH CARE EDUCATION/TRAINING PROGRAM

## 2024-06-10 PROCEDURE — 1159F MED LIST DOCD IN RCRD: CPT | Performed by: STUDENT IN AN ORGANIZED HEALTH CARE EDUCATION/TRAINING PROGRAM

## 2024-06-10 PROCEDURE — 99213 OFFICE O/P EST LOW 20 MIN: CPT | Performed by: STUDENT IN AN ORGANIZED HEALTH CARE EDUCATION/TRAINING PROGRAM

## 2024-06-10 NOTE — PROGRESS NOTES
"Chief Complaint  Follow-up and Pain of the Right Wrist    Subjective          Nika Edwards presents to [facility] for a follow up for her right distal radius fracture.     History of Present Illness    The patient presents here today for a follow up for her right distal radius fracture. She has been treating this conservatively with a short arm cast. She presents today in a sling. She reports no new injury or falls since her last visit. She reports minimal pain to her wrist. She was last seen in the office on 05/31/24 where she was placed in a short arm cast. She initially fell and landed on her wrist.   Allergies   Allergen Reactions    Iodinated Contrast Media Anaphylaxis     COULDN'T BREATHE OR SWALLOW    Latex Rash and Other (See Comments)     SKIN IRRITATION    Cephalexin Nausea And Vomiting    Contrast Dye (Echo Or Unknown Ct/Mr) Unknown - Low Severity        Social History     Socioeconomic History    Marital status:    Tobacco Use    Smoking status: Never    Smokeless tobacco: Never   Vaping Use    Vaping status: Never Used        I reviewed the patient's chief complaint, history of present illness, review of systems, past medical history, surgical history, family history, social history, medications, and allergy list.     REVIEW OF SYSTEMS    Constitutional: Denies fevers, chills, weight loss  Cardiovascular: Denies chest pain, shortness of breath  Skin: Denies rashes, acute skin changes  Neurologic: Denies headache, loss of consciousness  MSK: Right wrist pain       Objective   Vital Signs:   /77   Pulse 74   Ht 160 cm (63\")   Wt 73.5 kg (162 lb)   SpO2 93%   BMI 28.70 kg/m²     Body mass index is 28.7 kg/m².    Physical Exam    General: Alert. No acute distress.   Right upper extremity: short arm cast is clean, dry and intact, no swelling, no abrasions or wounds, able to wiggle her fingers, neurovascularly intact     Procedures    Imaging Results (Most " Recent)       Procedure Component Value Units Date/Time    XR Wrist 2 View Right [[id number]] Resulted: 06/10/24 1620     Updated: 06/10/24 1621    Narrative:      Indications: Follow-up right distal radius fracture    Views: AP and lateral right wrist    Findings: Dorsally angulated and comminuted fracture of the distal radius   with intra-articular extension again seen.  Dorsal tilt remains overall   stable.  Cast material in place.  Arthritic changes in the hand are   unchanged.    Comparative Data: Comparative data found and reviewed today                     Assessment and Plan        XR Wrist 2 View Right    Result Date: 6/10/2024  Narrative: Indications: Follow-up right distal radius fracture Views: AP and lateral right wrist Findings: Dorsally angulated and comminuted fracture of the distal radius with intra-articular extension again seen.  Dorsal tilt remains overall stable.  Cast material in place.  Arthritic changes in the hand are unchanged. Comparative Data: Comparative data found and reviewed today    XR Elbow 2 View Right    Result Date: 5/31/2024  Narrative: Indications: Fall, right elbow pain Views: AP and lateral right elbow Findings: No fractures are seen.  Ulnohumeral and radiocapitellar joints are well aligned. Comparative Data: No comparative data available    XR Wrist 2 View Right    Result Date: 5/31/2024  Narrative: Indications: Follow-up right distal radius fracture Views: AP and lateral right wrist Findings: Impacted and dorsally angulated fracture of the distal radius is again seen.  A separate ulnar styloid fracture appears stable.  The radiocarpal joint is reduced.  Degenerative changes in the hand are noticed. Comparative Data: Comparative data found and reviewed today      Diagnoses and all orders for this visit:    1. Other closed extra-articular fracture of distal end of right radius, initial encounter (Primary)  -     XR Wrist 2 View Right      The patient presents here today for a  follow up for her right wrist. X-rays were obtained in the office today and these were reviewed today.     Advised patient she can discontinue her sling and will continue her cast for another two weeks.  Cast care reviewed.      Will obtain X-Rays of right wrist at next visit in the cast.     Call or return if worsening symptoms.    Scribed for Neri Ruano MD by Miguelina Yanez  06/10/2024   15:04 EDT         Follow Up       2 weeks     Patient was given instructions and counseling regarding her condition or for health maintenance advice. Please see specific information pulled into the AVS if appropriate.     I have personally performed the services described in this document as scribed by the above individual and it is both accurate and complete. Neri Ruano MD 06/10/24 16:38 EDT

## 2024-07-01 ENCOUNTER — OFFICE VISIT (OUTPATIENT)
Dept: ORTHOPEDIC SURGERY | Facility: CLINIC | Age: 79
End: 2024-07-01
Payer: MEDICARE

## 2024-07-01 VITALS
DIASTOLIC BLOOD PRESSURE: 75 MMHG | OXYGEN SATURATION: 95 % | WEIGHT: 162 LBS | SYSTOLIC BLOOD PRESSURE: 150 MMHG | HEART RATE: 75 BPM | BODY MASS INDEX: 28.7 KG/M2 | HEIGHT: 63 IN

## 2024-07-01 DIAGNOSIS — M25.531 RIGHT WRIST PAIN: ICD-10-CM

## 2024-07-01 DIAGNOSIS — S52.551D OTHER CLOSED EXTRA-ARTICULAR FRACTURE OF DISTAL END OF RIGHT RADIUS WITH ROUTINE HEALING, SUBSEQUENT ENCOUNTER: Primary | ICD-10-CM

## 2024-07-01 PROCEDURE — 99024 POSTOP FOLLOW-UP VISIT: CPT | Performed by: PHYSICIAN ASSISTANT

## 2024-07-01 PROCEDURE — 29075 APPL CST ELBW FNGR SHORT ARM: CPT | Performed by: PHYSICIAN ASSISTANT

## 2024-07-01 RX ORDER — WARFARIN SODIUM 3 MG/1
TABLET ORAL
COMMUNITY
End: 2024-07-04

## 2024-07-01 NOTE — PROGRESS NOTES
"Chief Complaint  Follow-up and Pain of the Right Wrist    Subjective          Nika Edwards presents to [facility]   History of Present Illness    Nika Edwards presents today for a follow-up of her right wrist.  Patient is a right distal radius fracture that we have been treating conservatively in a short arm cast.  Today, patient denies complications with her cast.  She has been performing finger and elbow range of motion exercises without complications.  She denies numbness or tingling to her fingers.  Patient did have an incident where her cast got dirty and she is requesting a new cast today.      Allergies   Allergen Reactions    Iodinated Contrast Media Anaphylaxis     COULDN'T BREATHE OR SWALLOW    Latex Rash and Other (See Comments)     SKIN IRRITATION    Cephalexin Nausea And Vomiting    Contrast Dye (Echo Or Unknown Ct/Mr) Unknown - Low Severity        Social History     Socioeconomic History    Marital status:    Tobacco Use    Smoking status: Never    Smokeless tobacco: Never   Vaping Use    Vaping status: Never Used        I reviewed the patient's chief complaint, history of present illness, review of systems, past medical history, surgical history, family history, social history, medications, and allergy list.     REVIEW OF SYSTEMS    Constitutional: Denies fevers, chills, weight loss  Cardiovascular: Denies chest pain, shortness of breath  Skin: Denies rashes, acute skin changes  Neurologic: Denies headache, loss of consciousness  MSK: Right wrist pain      Objective   Vital Signs:   /75   Pulse 75   Ht 160 cm (63\")   Wt 73.5 kg (162 lb)   SpO2 95%   BMI 28.70 kg/m²     Body mass index is 28.7 kg/m².    Physical Exam    General: Alert. No acute distress.   Right upper extremity: Short arm cast in place today.  Cast intact and dry.  No wounds about the edge of the cast.  Elbow range of motion intact.  Finger range of motion intact.  Sensation intact " to the fingers.  Less than 2-second capillary refill.     Orthopedic Injury Treatment    Date/Time: 7/1/2024 10:08 AM    Performed by: Enma Magallon CMA  Authorized by: Alba Day PA-C  Injury location: Right wrist.  Pre-procedure neurovascular assessment: neurovascularly intact    Anesthesia:  Local anesthesia used: no    Sedation:  Patient sedated: no    Immobilization: cast  Splint type: Short Arm.  Supplies used: cotton padding  Post-procedure neurovascular assessment: post-procedure neurovascularly intact  Patient tolerance: patient tolerated the procedure well with no immediate complications  Comments: Closed treatment was obtained and fiberglass cast was applied.  The patient tolerated the procedure without any complications. Cast applied by KUSH Cheung.        Imaging Results (Most Recent)       Procedure Component Value Units Date/Time    XR Wrist 2 View Right [[id number]] Resulted: 07/01/24 1024     Updated: 07/01/24 1026    Narrative:      Indications: Follow-up right distal radius fracture    Views: AP and lateral right wrist    Findings: Right distal radius fracture is seen with new callus formation   present.  Fracture alignment and angulation remains stable.  Separate   ulnar styloid fracture is stable with callus formation at the fracture   site.  Arthritic changes are seen.  Casting material in place today.    Comparative Data: Comparative data found and reviewed today.                   Assessment and Plan    Diagnoses and all orders for this visit:    1. Other closed extra-articular fracture of distal end of right radius with routine healing, subsequent encounter (Primary)    2. Right wrist pain  -     XR Wrist 2 View Right    Other orders  -     Orthopedic Injury Treatment        Nika Edwards presents today to follow-up with a right distal radius fracture that we have been treating conservatively.  X-rays reviewed with the patient and family number today.  Short arm cast removed  today without complications.  Patient was placed in a new well-padded short arm cast.  Cast care again discussed.  Anticipate 2 more weeks of casting before transitioning to a wrist brace.  Remain nonweightbearing to the right upper extremity.  Use ice and elevation as needed for inflammation.      Patient will follow up in 2 weeks for reevaluation.  We will obtain new x-rays of the right wrist without cast at next visit.      Call or return if symptoms worsen or patient has any concerns.       Follow Up   Return in about 2 weeks (around 7/15/2024).  Patient was given instructions and counseling regarding her condition or for health maintenance advice. Please see specific information pulled into the AVS if appropriate.     Alba Day PA-C  07/01/24  10:32 EDT

## 2024-07-04 ENCOUNTER — HOSPITAL ENCOUNTER (EMERGENCY)
Facility: HOSPITAL | Age: 79
Discharge: HOME | End: 2024-07-04
Attending: EMERGENCY MEDICINE
Payer: MEDICARE

## 2024-07-04 VITALS
OXYGEN SATURATION: 96 % | RESPIRATION RATE: 16 BRPM | TEMPERATURE: 99.3 F | BODY MASS INDEX: 28.32 KG/M2 | SYSTOLIC BLOOD PRESSURE: 135 MMHG | HEIGHT: 63 IN | WEIGHT: 159.83 LBS | DIASTOLIC BLOOD PRESSURE: 75 MMHG | HEART RATE: 77 BPM

## 2024-07-04 DIAGNOSIS — S52.501D CLOSED FRACTURE OF DISTAL END OF RIGHT RADIUS WITH ROUTINE HEALING, UNSPECIFIED FRACTURE MORPHOLOGY, SUBSEQUENT ENCOUNTER: ICD-10-CM

## 2024-07-04 DIAGNOSIS — M25.561 ACUTE PAIN OF RIGHT KNEE: Primary | ICD-10-CM

## 2024-07-04 PROCEDURE — 99282 EMERGENCY DEPT VISIT SF MDM: CPT

## 2024-07-04 RX ORDER — CELECOXIB 100 MG/1
100 CAPSULE ORAL 2 TIMES DAILY PRN
Qty: 20 CAPSULE | Refills: 0 | Status: SHIPPED | OUTPATIENT
Start: 2024-07-04

## 2024-07-04 NOTE — ED PROVIDER NOTES
Time: 6:22 PM EDT  Date of encounter:  7/4/2024  Independent Historian/Clinical History and Information was obtained by:   Patient    History is limited by: N/A    Chief Complaint: right arm discomfort and right knee pain      History of Present Illness:  Patient is a 79 y.o. year old female who presents to the emergency department for evaluation of right arm discomfort and right knee pain.  Patient currently has a cast on her right arm that was placed by orthopedics on Monday.  She states that she has been having more swelling and discomfort on her right arm.  Denies any recent falls or traumas.  She feels like it is too tight.  She is also complaining of right knee pain today that started last night.  She is ambulatory in the ED.  She takes Tylenol and Motrin very seldomly.    HPI    Patient Care Team  Primary Care Provider: Fred Hedrick MD    Past Medical History:     Allergies   Allergen Reactions    Iodinated Contrast Media Anaphylaxis     COULDN'T BREATHE OR SWALLOW    Latex Rash and Other (See Comments)     SKIN IRRITATION    Cephalexin Nausea And Vomiting    Contrast Dye (Echo Or Unknown Ct/Mr) Unknown - Low Severity     Past Medical History:   Diagnosis Date    Arthritis     Bladder disorder     Cancer     Kidney disease      Past Surgical History:   Procedure Laterality Date    CENTRAL VENOUS CATHETER REMOVAL      COLON SURGERY      GALLBLADDER SURGERY      OSTOMY TAKE DOWN      PORTACATH PLACEMENT       Family History   Problem Relation Age of Onset    Diabetes Father     Colon cancer Brother        Home Medications:  Prior to Admission medications    Medication Sig Start Date End Date Taking? Authorizing Provider   celecoxib (CeleBREX) 100 MG capsule Take 1 capsule by mouth 2 (Two) Times a Day As Needed for Mild Pain. 7/4/24   Moe Kaur PA   Diclofenac Sodium (VOLTAREN) 1 % gel gel Apply 4 g topically to the appropriate area as directed 4 (Four) Times a Day As Needed (pain) for up to 7 days.  "7/4/24 7/11/24  Moe Kaur PA   warfarin (COUMADIN) 3 MG tablet Take  by mouth.  7/4/24  Provider, MD Yvrose        Social History:   Social History     Tobacco Use    Smoking status: Never    Smokeless tobacco: Never   Vaping Use    Vaping status: Never Used         Review of Systems:  Review of Systems   Constitutional:  Negative for chills and fever.   HENT:  Negative for ear pain.    Eyes:  Negative for pain.   Respiratory:  Negative for cough and shortness of breath.    Cardiovascular:  Negative for chest pain.   Gastrointestinal:  Negative for abdominal pain, diarrhea, nausea and vomiting.   Genitourinary:  Negative for dysuria.   Musculoskeletal:  Positive for arthralgias.   Skin:  Negative for rash.   Neurological:  Negative for headaches.        Physical Exam:  /75 (BP Location: Left arm, Patient Position: Sitting)   Pulse 77   Temp 99.3 °F (37.4 °C) (Oral)   Resp 16   Ht 160 cm (63\")   Wt 72.5 kg (159 lb 13.3 oz)   SpO2 96%   BMI 28.31 kg/m²     Physical Exam  HENT:      Head: Normocephalic.      Mouth/Throat:      Mouth: Mucous membranes are moist.   Eyes:      Pupils: Pupils are equal, round, and reactive to light.   Pulmonary:      Effort: Pulmonary effort is normal.   Abdominal:      General: There is no distension.   Musculoskeletal:      Cervical back: Neck supple.      Comments: R hand: Patient has a cast on her right forearm. Able to move fingers without difficulty. Mild swelling on her fingers. Cap refill normal.    R Knee: TTP especially to the back of the right knee. No swelling compared to the left knee.   Skin:     General: Skin is warm and dry.   Neurological:      General: No focal deficit present.      Mental Status: She is alert and oriented to person, place, and time.   Psychiatric:         Mood and Affect: Mood normal.         Behavior: Behavior normal.                  Procedures:  Procedures      Medical Decision Making:      Comorbidities that affect " care:    None    External Notes reviewed:    Previous Clinic Note: Reviewed clinic note on 7/1/24.      The following orders were placed and all results were independently analyzed by me:  No orders of the defined types were placed in this encounter.      Medications Given in the Emergency Department:  Medications - No data to display     ED Course:         Labs:    Lab Results (last 24 hours)       ** No results found for the last 24 hours. **             Imaging:    No Radiology Exams Resulted Within Past 24 Hours      Differential Diagnosis and Discussion:    Orthopedic Injuries: Differential diagnosis includes but is not limited to fractures, soft tissue injuries, dislocations, contusions, ligamentous injuries, tendon injuries, nerve injuries, compartment syndrome, bursitis, and vascular injuries.        MDM  Risk of Complications, Morbidity, and/or Mortality  Presenting problems: moderate  Diagnostic procedures: low  Management options: low    Patient Progress  Patient progress: stable    Patient presents to the emergency department for evaluation of right arm discomfort and right knee pain.  Patient currently has a cast on her right arm that was placed by orthopedics on Monday.  She states that she has been having more swelling and discomfort on her right arm.  Denies any recent falls or traumas.  She feels like it is too tight.  She is also complaining of right knee pain today that started last night.  She is ambulatory in the ED.  She takes Tylenol and Motrin very seldomly.    On exam  R hand: Patient has a cast on her right forearm. Able to move fingers without difficulty. Mild swelling on her fingers. Cap refill normal.    R Knee: TTP especially to the back of the right knee. No swelling compared to the left knee.    Family is somewhat worried about a clot on her right knee. States that the patient has had clot in the past. Discussed with the patient and family that I can schedule her for a duplex  ultrasound outpatient but they opted out.     Will dc pt with celebrex and voltaren gel. Recommend the pt to call her orthopedic surgeon in regards to her cast. Follow up with her PCP in 3 days.    Strict return to the ED if she develops any SOA, chest pain, nausea, vomiting.            Patient Care Considerations:    Xray: Considered ordering a knee xray but patient denies any recent falls or traumas. Pt is ambulatory in the ED.      Consultants/Shared Management Plan:    None    Social Determinants of Health:    Patient is independent, reliable, and has access to care.       Disposition and Care Coordination:    Discharged: The patient is suitable and stable for discharge with no need for consideration of admission.    I have explained the patient´s condition, diagnoses and treatment plan based on the information available to me at this time. I have answered questions and addressed any concerns. The patient has a good  understanding of the patient´s diagnosis, condition, and treatment plan as can be expected at this point. The vital signs have been stable. The patient´s condition is stable and appropriate for discharge from the emergency department.      The patient will pursue further outpatient evaluation with the primary care physician or other designated or consulting physician as outlined in the discharge instructions. They are agreeable to this plan of care and follow-up instructions have been explained in detail. The patient has received these instructions in written format and has expressed an understanding of the discharge instructions. The patient is aware that any significant change in condition or worsening of symptoms should prompt an immediate return to this or the closest emergency department or call to 911.  I have explained discharge medications and the need for follow up with the patient/caretakers. This was also printed in the discharge instructions. Patient was discharged with the following  medications and follow up:      Medication List        New Prescriptions      celecoxib 100 MG capsule  Commonly known as: CeleBREX  Take 1 capsule by mouth 2 (Two) Times a Day As Needed for Mild Pain.     Diclofenac Sodium 1 % gel gel  Commonly known as: VOLTAREN  Apply 4 g topically to the appropriate area as directed 4 (Four) Times a Day As Needed (pain) for up to 7 days.               Where to Get Your Medications        These medications were sent to [facility]/pharmacy #[id number] - [place], [place] - [address] - [phone] [facility] [phone]   [address] [place] [address] [place] [address]      Hours: 24-hours Phone: [phone]   celecoxib 100 MG capsule  Diclofenac Sodium 1 % gel gel      No follow-up provider specified.     Final diagnoses:   Acute pain of right knee   Closed fracture of distal end of right radius with routine healing, unspecified fracture morphology, subsequent encounter        ED Disposition       ED Disposition   Discharge    Condition   Stable    Comment   --               This medical record created using voice recognition software.             Moe Kaur PA  07/04/24 6917

## 2024-07-04 NOTE — DISCHARGE INSTRUCTIONS
You are being discharged home with celebrex and voltaren gel. Take your celebrex with meals. Do not take with other NSAIDs such as advil and aleve. Apply the voltaren gel up to 4-6 times a day.    Follow up with your orthopedic surgeon tomorrow to further discuss your right arm discomfort.     Follow up with your PCP in 3 days.    Return to the Emergency Department if you develop any shortness of breath, chest pain, uncontrollable fever, intractable pain, nausea, vomiting.

## 2024-07-19 ENCOUNTER — OFFICE VISIT (OUTPATIENT)
Dept: ORTHOPEDIC SURGERY | Facility: CLINIC | Age: 79
End: 2024-07-19
Payer: MEDICARE

## 2024-07-19 DIAGNOSIS — S52.551D OTHER CLOSED EXTRA-ARTICULAR FRACTURE OF DISTAL END OF RIGHT RADIUS WITH ROUTINE HEALING, SUBSEQUENT ENCOUNTER: Primary | ICD-10-CM

## 2024-07-22 VITALS
HEIGHT: 63 IN | OXYGEN SATURATION: 95 % | DIASTOLIC BLOOD PRESSURE: 64 MMHG | HEART RATE: 66 BPM | WEIGHT: 155 LBS | SYSTOLIC BLOOD PRESSURE: 130 MMHG | BODY MASS INDEX: 27.46 KG/M2

## 2024-07-22 NOTE — PROGRESS NOTES
"Chief Complaint  Follow-up and Pain of the Right Wrist    Subjective          Nika Edwards presents to [facility]   History of Present Illness    Nika Edwards presents today for a follow-up of her right wrist.  Patient has a right distal radius fracture that we have been treating conservatively.  Today, patient states that she is doing okay.  She denies complications after cast removal.  She reports minimal to no pain.  She reports some swelling to the hand.      Allergies   Allergen Reactions    Iodinated Contrast Media Anaphylaxis     COULDN'T BREATHE OR SWALLOW    Latex Rash and Other (See Comments)     SKIN IRRITATION    Cephalexin Nausea And Vomiting    Contrast Dye (Echo Or Unknown Ct/Mr) Unknown - Low Severity        Social History     Socioeconomic History    Marital status:    Tobacco Use    Smoking status: Never    Smokeless tobacco: Never   Vaping Use    Vaping status: Never Used        I reviewed the patient's chief complaint, history of present illness, review of systems, past medical history, surgical history, family history, social history, medications, and allergy list.     REVIEW OF SYSTEMS    Constitutional: Denies fevers, chills, weight loss  Cardiovascular: Denies chest pain, shortness of breath  Skin: Denies rashes, acute skin changes  Neurologic: Denies headache, loss of consciousness  MSK: Right wrist pain      Objective   Vital Signs:   /64   Pulse 66   Ht 160 cm (63\")   Wt 70.3 kg (155 lb)   SpO2 95%   BMI 27.46 kg/m²     Body mass index is 27.46 kg/m².    Physical Exam    General: Alert. No acute distress.   Right upper extremity: Short arm cast removed today without complications.  No wounds from cast wear.  Forearm soft.  Mild swelling to the hand and wrist.  Full finger flexion.  Full finger extension.  Forearm soft.  Demonstrates active wrist flexion and extension with associated stiffness.  Pain with wrist range of motion.  Unable " to perform thumb opposition due to stiffness.  Sensation intact to median, radial, and ulnar nerve distributions.  Palpable radial pulse.    Procedures    Imaging Results (Most Recent)       Procedure Component Value Units Date/Time    XR Wrist 2 View Right [[id number]] Resulted: 07/23/24 0734     Updated: 07/23/24 0735    Narrative:      Indications: Follow-up right distal radius fracture    Views: AP and lateral right wrist    Findings: Right distal radius fracture is seen.  Fracture is well aligned   and stable with good callus formation at the fracture site.  Separate   ulnar styloid fracture appears stable.  All joints appear reduced.    Comparative Data: Comparative data found and reviewed today.                   Assessment and Plan    Diagnoses and all orders for this visit:    1. Other closed extra-articular fracture of distal end of right radius with routine healing, subsequent encounter (Primary)  -     Ambulatory Referral to Physical Therapy  -     XR Wrist 2 View Right        Nika Edwadrs presents today to follow-up with her right distal radius fracture that we have been treating conservatively.  X-rays reviewed by me today.  Short arm cast removed today without complications.  No wounds from cast wear.  Patient was placed in a comfort form wrist brace.  She instructed her wrist brace at all times, removing it for range of motion and hygiene.  Range of motion exercises were demonstrated the office today.  Formal physical therapy was ordered.  We discussed the importance of making a tight fist.  Use ice and elevation as needed for inflammation.  1 pound lifting restriction at this time.      Patient will follow up in 4 weeks for reevaluation.  We will obtain new x-rays of the right wrist at next visit.      Call or return if symptoms worsen or patient has any concerns.       Follow Up   Return in about 4 weeks (around 8/16/2024).  Patient was given instructions and counseling regarding her condition or  for health maintenance advice. Please see specific information pulled into the AVS if appropriate.     Alba Day PA-C  07/26/24  07:54 EDT

## 2024-09-06 ENCOUNTER — OFFICE VISIT (OUTPATIENT)
Dept: ORTHOPEDIC SURGERY | Facility: CLINIC | Age: 79
End: 2024-09-06
Payer: MEDICARE

## 2024-09-06 VITALS
OXYGEN SATURATION: 96 % | WEIGHT: 155 LBS | BODY MASS INDEX: 27.46 KG/M2 | HEIGHT: 63 IN | HEART RATE: 70 BPM | SYSTOLIC BLOOD PRESSURE: 147 MMHG | DIASTOLIC BLOOD PRESSURE: 80 MMHG

## 2024-09-06 DIAGNOSIS — S52.551D OTHER CLOSED EXTRA-ARTICULAR FRACTURE OF DISTAL END OF RIGHT RADIUS WITH ROUTINE HEALING, SUBSEQUENT ENCOUNTER: ICD-10-CM

## 2024-09-06 DIAGNOSIS — M25.531 RIGHT WRIST PAIN: Primary | ICD-10-CM

## 2024-09-06 DIAGNOSIS — G56.01 CARPAL TUNNEL SYNDROME OF RIGHT WRIST: ICD-10-CM

## 2024-09-06 NOTE — PROGRESS NOTES
"Chief Complaint  Follow-up and Pain of the Right Wrist    Subjective          Nika Edwards presents to [facility] for   History of Present Illness    Nika presents today for evaluation/follow-up of her right wrist.  She has a right distal radius fracture we are treating nonoperatively.  She is wearing her brace intermittently.  She still has some pain over the radial side of the wrist.  She reports numbness to the third and fourth fingers.  She denies any new injuries.  She is attending physical therapy once a week.    Allergies   Allergen Reactions    Iodinated Contrast Media Anaphylaxis     COULDN'T BREATHE OR SWALLOW    Latex Rash and Other (See Comments)     SKIN IRRITATION    Cephalexin Nausea And Vomiting    Contrast Dye (Echo Or Unknown Ct/Mr) Unknown - Low Severity        Social History     Socioeconomic History    Marital status:    Tobacco Use    Smoking status: Never    Smokeless tobacco: Never   Vaping Use    Vaping status: Never Used        I reviewed the patient's chief complaint, history of present illness, review of systems, past medical history, surgical history, family history, social history, medications, and allergy list.     REVIEW OF SYSTEMS    Constitutional: Denies fevers, chills, weight loss  Cardiovascular: Denies chest pain, shortness of breath  Skin: Denies rashes, acute skin changes  Neurologic: Denies headache, loss of consciousness  MSK: Right wrist pain      Objective   Vital Signs:   /80   Pulse 70   Ht 160 cm (63\")   Wt 70.3 kg (155 lb)   SpO2 96%   BMI 27.46 kg/m²     Body mass index is 27.46 kg/m².    Physical Exam    General: Alert. No acute distress.   Right upper extremity: Minimal deformity.  Nontender over the distal radius.  Tenderness over the CMC joint at the base of the thumb.  Arthritic deformity to the thumb.  Mild muscle atrophy in the hand.  Paresthesias in the third and fourth finger with positive Phalen's " compression testing.  45 degrees of wrist flexion and extension.  60 degrees of supination and 80 degrees of pronation.  Palpable radial pulse.    Procedures    Imaging Results (Most Recent)       Procedure Component Value Units Date/Time    XR Wrist 2 View Right [[id number]] Resulted: 09/06/24 1128     Updated: 09/06/24 1129    Narrative:      Indications: Follow-up right distal radius, ulnar styloid fracture    Views: AP and lateral right wrist    Findings: Healing extra-articular distal radius fracture with continued   consolidation.  Stable appearance of the ulnar styloid fracture.  There   appears to be bony bridging across the fracture site.  All joints.'s.    Degenerative changes in the wrist/hand are stable.    Comparative Data: Comparative data found and reviewed today                     Assessment and Plan        XR Wrist 2 View Right    Result Date: 9/6/2024  Narrative: Indications: Follow-up right distal radius, ulnar styloid fracture Views: AP and lateral right wrist Findings: Healing extra-articular distal radius fracture with continued consolidation.  Stable appearance of the ulnar styloid fracture.  There appears to be bony bridging across the fracture site.  All joints.'s.  Degenerative changes in the wrist/hand are stable. Comparative Data: Comparative data found and reviewed today      Diagnoses and all orders for this visit:    1. Right wrist pain (Primary)  -     XR Wrist 2 View Right    2. Other closed extra-articular fracture of distal end of right radius with routine healing, subsequent encounter    3. Carpal tunnel syndrome of right wrist        We discussed treatment options and reviewed her x-ray.  Her fracture is healing appropriately.  She does have carpal tunnel syndrome.  A carpal tunnel brace was provided today.  We discussed possible carpal tunnel injections versus evaluation with an EMG in the future if needed.  She will follow-up in 8 weeks for reevaluation.  She may progress to  independent physical therapy exercises.  We will obtain new x-rays of the right wrist when she returns.      Call or return if worsening symptoms.    Scribed for Neri Ruano MD by Neri Ruano MD  09/06/2024   08:48 EDT         Follow Up       8 weeks    Patient was given instructions and counseling regarding her condition or for health maintenance advice. Please see specific information pulled into the AVS if appropriate.           I have personally performed the services described in this document as scribed by the above individual and it is both accurate and complete. Neri Ruano MD 09/06/24 11:53 EDT

## 2024-10-18 ENCOUNTER — OFFICE VISIT (OUTPATIENT)
Dept: ORTHOPEDIC SURGERY | Facility: CLINIC | Age: 79
End: 2024-10-18
Payer: MEDICARE

## 2024-10-18 VITALS — DIASTOLIC BLOOD PRESSURE: 61 MMHG | OXYGEN SATURATION: 95 % | SYSTOLIC BLOOD PRESSURE: 133 MMHG | HEART RATE: 66 BPM

## 2024-10-18 DIAGNOSIS — S52.551D OTHER CLOSED EXTRA-ARTICULAR FRACTURE OF DISTAL END OF RIGHT RADIUS WITH ROUTINE HEALING, SUBSEQUENT ENCOUNTER: Primary | ICD-10-CM

## 2024-10-18 DIAGNOSIS — M25.531 RIGHT WRIST PAIN: ICD-10-CM

## 2024-10-18 DIAGNOSIS — G56.01 CARPAL TUNNEL SYNDROME OF RIGHT WRIST: ICD-10-CM

## 2024-10-18 PROCEDURE — 99213 OFFICE O/P EST LOW 20 MIN: CPT | Performed by: PHYSICIAN ASSISTANT

## 2024-10-21 NOTE — PROGRESS NOTES
Chief Complaint  Follow-up of the Right Wrist    Subjective          Nika Edwards presents to [facility]   History of Present Illness    Nika Edwards presents today for a follow-up of her right wrist.  Patient has a right distal radius fracture that we are treating conservatively.  Today, patient states that her wrist is doing okay.  She states that she still experiences numbness and tingling to the hand at all times and this worsens at night.  States that she wears her wrist brace at times.      Allergies   Allergen Reactions    Iodinated Contrast Media Anaphylaxis     COULDN'T BREATHE OR SWALLOW    Latex Rash and Other (See Comments)     SKIN IRRITATION    Cephalexin Nausea And Vomiting    Contrast Dye (Echo Or Unknown Ct/Mr) Unknown - Low Severity        Social History     Socioeconomic History    Marital status:    Tobacco Use    Smoking status: Never    Smokeless tobacco: Never   Vaping Use    Vaping status: Never Used        I reviewed the patient's chief complaint, history of present illness, review of systems, past medical history, surgical history, family history, social history, medications, and allergy list.     REVIEW OF SYSTEMS    Constitutional: Denies fevers, chills, weight loss  Cardiovascular: Denies chest pain, shortness of breath  Skin: Denies rashes, acute skin changes  Neurologic: Denies headache, loss of consciousness  MSK: Right wrist pain      Objective   Vital Signs:   /61   Pulse 66   SpO2 95%     There is no height or weight on file to calculate BMI.    Physical Exam    General: Alert. No acute distress.   Right upper extremity: Forearm soft.  Demonstrates active wrist flexion and extension.  Wrist flexion 45.  Wrist extension 45.  No pain with wrist range of motion.  Full finger flexion and extension.  Thumb opposition intact.  Palmar adduction of thumb intact.  Sensation intact to the median, radial, and ulnar nerve distributions with  associated paresthesias.  Palpable radial pulse.    Procedures    Imaging Results (Most Recent)       Procedure Component Value Units Date/Time    XR Wrist 2 View Right [[id number]] Resulted: 10/21/24 0921     Updated: 10/21/24 0921    Narrative:      Indications: Follow-up right wrist fracture    Views: AP and lateral right wrist    Findings: Right distal radius fracture is stable with callus formation   present.  Separate ulnar styloid fracture remains stable.  All joints   appear reduced.    Comparative Data: Comparative data found and reviewed today.                   Assessment and Plan    Diagnoses and all orders for this visit:    1. Other closed extra-articular fracture of distal end of right radius with routine healing, subsequent encounter (Primary)    2. Carpal tunnel syndrome of right wrist    3. Right wrist pain  -     XR Wrist 2 View Right        Nika Edwards presents today to follow-up with her right distal radius fracture that we are treating conservatively and her carpal tunnel syndrome.  Patient instructed to continue with home exercises for wrist range of motion.  Okay to wear wrist brace only as needed.  Continue to gradually progress with activities as tolerated.  We discussed the importance of wearing her carpal tunnel wrist brace at night to help alleviate her symptoms.  Patient expressed understanding.      Patient will follow up as needed.    Call or return if symptoms worsen or patient has any concerns.       Follow Up   Return if symptoms worsen or fail to improve.  Patient was given instructions and counseling regarding her condition or for health maintenance advice. Please see specific information pulled into the AVS if appropriate.     Alba Day PA-C  10/21/24  09:27 EDT